# Patient Record
Sex: MALE | URBAN - METROPOLITAN AREA
[De-identification: names, ages, dates, MRNs, and addresses within clinical notes are randomized per-mention and may not be internally consistent; named-entity substitution may affect disease eponyms.]

---

## 2017-01-17 ENCOUNTER — EMERGENCY (EMERGENCY)
Facility: HOSPITAL | Age: 22
LOS: 0 days | Discharge: HOME | End: 2017-01-17
Admitting: INTERNAL MEDICINE
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
## 2017-06-27 DIAGNOSIS — S86.812A STRAIN OF OTHER MUSCLE(S) AND TENDON(S) AT LOWER LEG LEVEL, LEFT LEG, INITIAL ENCOUNTER: ICD-10-CM
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
## 2017-06-27 DIAGNOSIS — M79.89 OTHER SPECIFIED SOFT TISSUE DISORDERS: ICD-10-CM

## 2023-06-13 ENCOUNTER — APPOINTMENT (OUTPATIENT)
Dept: ORTHOPEDIC SURGERY | Facility: CLINIC | Age: 28
End: 2023-06-13
Payer: COMMERCIAL

## 2023-06-13 ENCOUNTER — NON-APPOINTMENT (OUTPATIENT)
Age: 28
End: 2023-06-13

## 2023-06-13 DIAGNOSIS — S83.206A UNSPECIFIED TEAR OF UNSPECIFIED MENISCUS, CURRENT INJURY, RIGHT KNEE, INITIAL ENCOUNTER: ICD-10-CM

## 2023-06-13 PROBLEM — Z00.00 ENCOUNTER FOR PREVENTIVE HEALTH EXAMINATION: Status: ACTIVE | Noted: 2023-06-13

## 2023-06-13 PROCEDURE — 99203 OFFICE O/P NEW LOW 30 MIN: CPT

## 2023-06-13 PROCEDURE — 73562 X-RAY EXAM OF KNEE 3: CPT | Mod: RT

## 2023-06-13 NOTE — HISTORY OF PRESENT ILLNESS
[de-identified] : Patient is a 28-year-old male who reports to the office for evaluation of his right knee pain that is been aggravating him for the past few weeks.  He states he was playing soccer when he got shoulder bumped which caused his knee to twist awkwardly.  Admits that the knee buckle/give out on him as well.  Admits to knee swelling that has come and gone.  He played soccer again recently and his knee buckled/give out on him as he was performing a pass.  He has been doing at home exercises which have been giving him no relief.  Walking, up and down stairs, getting up from a seated position, flexing and extending the knee all aggravate the patient's pain.

## 2023-06-13 NOTE — IMAGING
[de-identified] : Right knee exam is as follows: Mild effusion noted.  No erythema or ecchymosis.  Able to perform active straight leg raise.  Knee flexion from 0 to 90 degrees with stiffness and pain.  Medial/lateral facet of patella, medial/lateral joint line tenderness to palpation.  Calf is soft and nontender.  Positive Bo's.  Equivocal Lachman's.  Light touch intact throughout.  Mildly antalgic gait.\par \par Right knee x-rays taken in office today revealed no obvious fractures, subluxations, or dislocations.  Possible bony cyst/enchondroma noted at distal aspect of right femur.  Otherwise, no other significant abnormalities were seen.

## 2023-06-13 NOTE — DISCUSSION/SUMMARY
[de-identified] : Patient clinically may have a meniscal tear.  Right knee MRI ordered for further evaluation.  Patient was advised to call the office a few days after getting the MRI done to discuss results over the phone.\par \par The patient was advised to rest/ice the area and may alternate with warm compresses as needed.  Instructed not to perform any strenuous activity that may worsen symptoms.  He will take OTC NSAIDs as needed for pain.  Right knee compression sleeve advised for support/stability.\par \par The knee conditioning program from the [facility] was given to the patient so they may try that at home.  The patient will follow-up in 4 weeks for further evaluation.  All of the patient's questions/concerns were answered in detail.\par \par

## 2023-06-14 ENCOUNTER — APPOINTMENT (OUTPATIENT)
Dept: MRI IMAGING | Facility: CLINIC | Age: 28
End: 2023-06-14
Payer: COMMERCIAL

## 2023-06-14 ENCOUNTER — TRANSCRIPTION ENCOUNTER (OUTPATIENT)
Age: 28
End: 2023-06-14

## 2023-06-14 PROCEDURE — 73721 MRI JNT OF LWR EXTRE W/O DYE: CPT | Mod: RT

## 2023-07-06 ENCOUNTER — APPOINTMENT (OUTPATIENT)
Dept: ORTHOPEDIC SURGERY | Facility: CLINIC | Age: 28
End: 2023-07-06

## 2023-07-14 ENCOUNTER — APPOINTMENT (OUTPATIENT)
Dept: ORTHOPEDIC SURGERY | Facility: CLINIC | Age: 28
End: 2023-07-14
Payer: COMMERCIAL

## 2023-07-14 DIAGNOSIS — M25.561 PAIN IN RIGHT KNEE: ICD-10-CM

## 2023-07-14 PROCEDURE — 99214 OFFICE O/P EST MOD 30 MIN: CPT

## 2023-07-16 NOTE — HISTORY OF PRESENT ILLNESS
[de-identified] : Patient here for evaluation right knee pain.\par Complains of joint line pain\par Positive mechanical symptoms and catching\par \par NAD\par Right knee\par No skin breakdown\par Medial joint line ttp\par Positive xochitl\par Negative lachman\par Negative varus/valgus instability\par ROM 0-130\par Pain with forced extension and flexion\par NVI\par Compartments soft and NT\par \par Xray reviewed and significant for right knee mild degenerative changes\par \par Plan\par mri right knee\par IMPRESSION:\par 1. Findings consistent with a recent proximal complete ACL tear with retraction and recent anterior tibial translation episode \par with bone contusions in the lateral compartment.\par 2. Peripheral vertical tear in the posterior horn of the medial meniscus with surrounding synovitis.\par 3. Fraying of the anterior horn of the lateral meniscus with mild surrounding synovitis.\par 4. Mild PCL sprain, mild MCL sprain and mild fibular collateral ligament sprain.\par 5. Moderate effusion, synovitis, multiple plica, and mild posterolateral soft tissue swelling and muscle strains with small \par popliteal cyst.\par 6. Clinical correlation regarding recent trauma and anterior instability on physical exam is recommended.\par \par plan\par went over fidnings\par explained the mri\par explained tx options\par op vs nonop\par expained rehab and graft options, quad autograft\par will proceed with surgery\par right knee acl reconstruction with autograft, medial and lateral meniscus repair\par \par Operative and nonoperative options discussed with patient. Surgical risks, benefits, and alternatives explained. Surgical risks include but are not exclusive to bleeding, infection, neurovascular damage, continued pain, stiffness, scarring, rsd, dvt/pe, potential failure of surgery that may require further surgery in the future. I went over incisions and rehabilitation. All questions answered. \par \par

## 2023-08-22 ENCOUNTER — APPOINTMENT (OUTPATIENT)
Dept: ORTHOPEDIC SURGERY | Facility: CLINIC | Age: 28
End: 2023-08-22

## 2024-11-21 ENCOUNTER — NON-APPOINTMENT (OUTPATIENT)
Age: 29
End: 2024-11-21

## 2024-11-21 ENCOUNTER — APPOINTMENT (OUTPATIENT)
Dept: ORTHOPEDIC SURGERY | Facility: CLINIC | Age: 29
End: 2024-11-21
Payer: COMMERCIAL

## 2024-11-21 DIAGNOSIS — S83.206A UNSPECIFIED TEAR OF UNSPECIFIED MENISCUS, CURRENT INJURY, RIGHT KNEE, INITIAL ENCOUNTER: ICD-10-CM

## 2024-11-21 PROCEDURE — 99213 OFFICE O/P EST LOW 20 MIN: CPT
